# Patient Record
Sex: MALE | Race: WHITE
[De-identification: names, ages, dates, MRNs, and addresses within clinical notes are randomized per-mention and may not be internally consistent; named-entity substitution may affect disease eponyms.]

---

## 2019-04-22 ENCOUNTER — HOSPITAL ENCOUNTER (INPATIENT)
Dept: HOSPITAL 94 - ER | Age: 79
LOS: 2 days | Discharge: HOME HEALTH | DRG: 291 | End: 2019-04-24
Attending: FAMILY MEDICINE | Admitting: FAMILY MEDICINE
Payer: MEDICARE

## 2019-04-22 VITALS — HEIGHT: 75 IN | WEIGHT: 175.38 LBS | BODY MASS INDEX: 21.81 KG/M2

## 2019-04-22 VITALS — DIASTOLIC BLOOD PRESSURE: 81 MMHG | SYSTOLIC BLOOD PRESSURE: 141 MMHG

## 2019-04-22 VITALS — SYSTOLIC BLOOD PRESSURE: 139 MMHG | DIASTOLIC BLOOD PRESSURE: 68 MMHG

## 2019-04-22 VITALS — SYSTOLIC BLOOD PRESSURE: 140 MMHG | DIASTOLIC BLOOD PRESSURE: 74 MMHG

## 2019-04-22 VITALS — DIASTOLIC BLOOD PRESSURE: 66 MMHG | SYSTOLIC BLOOD PRESSURE: 124 MMHG

## 2019-04-22 DIAGNOSIS — I27.20: ICD-10-CM

## 2019-04-22 DIAGNOSIS — Z95.5: ICD-10-CM

## 2019-04-22 DIAGNOSIS — D61.818: ICD-10-CM

## 2019-04-22 DIAGNOSIS — I13.0: Primary | ICD-10-CM

## 2019-04-22 DIAGNOSIS — E11.22: ICD-10-CM

## 2019-04-22 DIAGNOSIS — H35.30: ICD-10-CM

## 2019-04-22 DIAGNOSIS — Z79.899: ICD-10-CM

## 2019-04-22 DIAGNOSIS — N40.0: ICD-10-CM

## 2019-04-22 DIAGNOSIS — E78.5: ICD-10-CM

## 2019-04-22 DIAGNOSIS — N18.9: ICD-10-CM

## 2019-04-22 DIAGNOSIS — J91.8: ICD-10-CM

## 2019-04-22 DIAGNOSIS — Z79.82: ICD-10-CM

## 2019-04-22 DIAGNOSIS — H54.8: ICD-10-CM

## 2019-04-22 DIAGNOSIS — E87.6: ICD-10-CM

## 2019-04-22 DIAGNOSIS — I50.31: ICD-10-CM

## 2019-04-22 DIAGNOSIS — R74.8: ICD-10-CM

## 2019-04-22 DIAGNOSIS — Z93.3: ICD-10-CM

## 2019-04-22 DIAGNOSIS — I25.10: ICD-10-CM

## 2019-04-22 DIAGNOSIS — Z85.048: ICD-10-CM

## 2019-04-22 DIAGNOSIS — F41.1: ICD-10-CM

## 2019-04-22 LAB
ALBUMIN SERPL BCP-MCNC: 3.3 G/DL (ref 3.4–5)
ALBUMIN/GLOB SERPL: 0.8 {RATIO} (ref 1.1–1.5)
ALP SERPL-CCNC: 69 IU/L (ref 46–116)
ALT SERPL W P-5'-P-CCNC: 16 U/L (ref 12–78)
ANION GAP SERPL CALCULATED.3IONS-SCNC: 11 MMOL/L (ref 8–16)
ANISOCYTOSIS BLD QL SMEAR: (no result)
APPEARANCE SPUN FLD: CLEAR
APTT PPP: 28 SECONDS (ref 22–32)
AST SERPL W P-5'-P-CCNC: 12 U/L (ref 10–37)
BASOPHILS # BLD AUTO: 0 X10'3 (ref 0–0.2)
BASOPHILS NFR BLD AUTO: 0.3 % (ref 0–1)
BILIRUB SERPL-MCNC: 1.4 MG/DL (ref 0.1–1)
BODY FLD TYPE: (no result)
BUN SERPL-MCNC: 19 MG/DL (ref 7–18)
BUN/CREAT SERPL: 13.6 (ref 5.4–32)
CALCIUM SERPL-MCNC: 8.9 MG/DL (ref 8.5–10.1)
CHLORIDE SERPL-SCNC: 106 MMOL/L (ref 99–107)
CO2 SERPL-SCNC: 24.6 MMOL/L (ref 24–32)
COLOR FLD: YELLOW
CREAT SERPL-MCNC: 1.4 MG/DL (ref 0.6–1.1)
D DIMER PPP FEU-MCNC: 2.89 MG/L FEU (ref 0–0.5)
EOSINOPHIL # BLD AUTO: 0 X10'3 (ref 0–0.9)
EOSINOPHIL NFR BLD AUTO: 0.2 % (ref 0–6)
ERYTHROCYTE [DISTWIDTH] IN BLOOD BY AUTOMATED COUNT: 19.7 % (ref 11.5–14.5)
GFR SERPL CREATININE-BSD FRML MDRD: 49 ML/MIN
GLUCOSE FLD-MCNC: 116 MG/DL
GLUCOSE SERPL-MCNC: 112 MG/DL (ref 70–104)
HCT VFR BLD AUTO: 25.4 % (ref 42–52)
HGB BLD-MCNC: 8.5 G/DL (ref 14–17.9)
INR PPP: 1.2 INR
LDH FLD-CCNC: 102 U/L
LG PLATELETS BLD QL SMEAR: (no result)
LYMPHOCYTES # BLD AUTO: 0.7 X10'3 (ref 1.1–4.8)
LYMPHOCYTES NFR BLD AUTO: 23.7 % (ref 21–51)
LYMPHOCYTES NFR BLD MANUAL: 20 % (ref 21–51)
LYMPHOCYTES NFR FLD MANUAL: 76 %
MACROCYTES BLD QL SMEAR: (no result)
MCH RBC QN AUTO: 34.9 PG (ref 27–31)
MCHC RBC AUTO-ENTMCNC: 33.3 G/DL (ref 33–36.5)
MCV RBC AUTO: 104.7 FL (ref 78–98)
MESOTHL CELL FLD QL MICRO: (no result)
MONOCYTES # BLD AUTO: 0.2 X10'3 (ref 0–0.9)
MONOCYTES NFR BLD AUTO: 7.5 % (ref 2–12)
MONOCYTES NFR BLD MANUAL: 4 % (ref 2–12)
MONOCYTES NFR FLD MANUAL: 22 %
NEUTROPHILS # BLD AUTO: 2 X10'3 (ref 1.8–7.7)
NEUTROPHILS NFR BLD AUTO: 68.3 % (ref 42–75)
NEUTROPHILS NFR FLD MANUAL: 2 %
NEUTS SEG NFR BLD MANUAL: 76 % (ref 42–75)
PH FLD: 7.4 [PH] (ref 7.63–7.65)
PHOSPHATE SERPL-MCNC: 3.8 MG/DL (ref 2.3–4.5)
PLATELET # BLD AUTO: 50 X10'3 (ref 140–440)
PLATELET BLD QL SMEAR: (no result)
PMV BLD AUTO: 9.7 FL (ref 7.4–10.4)
POTASSIUM SERPL-SCNC: 4 MMOL/L (ref 3.5–5.1)
PROT FLD-MCNC: 3.2 G/DL
PROT SERPL-MCNC: 7.5 G/DL (ref 6.4–8.2)
RBC # BLD AUTO: 2.43 X10'6 (ref 4.7–6.1)
RBC # FLD MANUAL: 430 /CU MM
RBC MORPH BLD: (no result)
SODIUM SERPL-SCNC: 142 MMOL/L (ref 135–145)
SPECIMEN VOL FLD: 56 ML
STOMATOCYTES BLD QL SMEAR: (no result)
TOTAL CELLS COUNTED FLD: 100
WBC # BLD AUTO: 2.9 X10'3 (ref 4.5–11)
WBC # FLD MANUAL: 309 /CU MM (ref 0–1000)

## 2019-04-22 PROCEDURE — B32T1ZZ COMPUTERIZED TOMOGRAPHY (CT SCAN) OF LEFT PULMONARY ARTERY USING LOW OSMOLAR CONTRAST: ICD-10-PCS

## 2019-04-22 PROCEDURE — B32S1ZZ COMPUTERIZED TOMOGRAPHY (CT SCAN) OF RIGHT PULMONARY ARTERY USING LOW OSMOLAR CONTRAST: ICD-10-PCS

## 2019-04-22 PROCEDURE — 88342 IMHCHEM/IMCYTCHM 1ST ANTB: CPT

## 2019-04-22 PROCEDURE — 96374 THER/PROPH/DIAG INJ IV PUSH: CPT

## 2019-04-22 PROCEDURE — 89051 BODY FLUID CELL COUNT: CPT

## 2019-04-22 PROCEDURE — 5A09357 ASSISTANCE WITH RESPIRATORY VENTILATION, LESS THAN 24 CONSECUTIVE HOURS, CONTINUOUS POSITIVE AIRWAY PRESSURE: ICD-10-PCS | Performed by: FAMILY MEDICINE

## 2019-04-22 PROCEDURE — BW211ZZ COMPUTERIZED TOMOGRAPHY (CT SCAN) OF ABDOMEN AND PELVIS USING LOW OSMOLAR CONTRAST: ICD-10-PCS

## 2019-04-22 PROCEDURE — 93005 ELECTROCARDIOGRAM TRACING: CPT

## 2019-04-22 PROCEDURE — 88341 IMHCHEM/IMCYTCHM EA ADD ANTB: CPT

## 2019-04-22 PROCEDURE — 80048 BASIC METABOLIC PNL TOTAL CA: CPT

## 2019-04-22 PROCEDURE — 83615 LACTATE (LD) (LDH) ENZYME: CPT

## 2019-04-22 PROCEDURE — 74177 CT ABD & PELVIS W/CONTRAST: CPT

## 2019-04-22 PROCEDURE — 88108 CYTOPATH CONCENTRATE TECH: CPT

## 2019-04-22 PROCEDURE — 80053 COMPREHEN METABOLIC PANEL: CPT

## 2019-04-22 PROCEDURE — 94760 N-INVAS EAR/PLS OXIMETRY 1: CPT

## 2019-04-22 PROCEDURE — 0W9B3ZZ DRAINAGE OF LEFT PLEURAL CAVITY, PERCUTANEOUS APPROACH: ICD-10-PCS | Performed by: RADIOLOGY

## 2019-04-22 PROCEDURE — 83036 HEMOGLOBIN GLYCOSYLATED A1C: CPT

## 2019-04-22 PROCEDURE — 85025 COMPLETE CBC W/AUTO DIFF WBC: CPT

## 2019-04-22 PROCEDURE — 85730 THROMBOPLASTIN TIME PARTIAL: CPT

## 2019-04-22 PROCEDURE — 80061 LIPID PANEL: CPT

## 2019-04-22 PROCEDURE — 84157 ASSAY OF PROTEIN OTHER: CPT

## 2019-04-22 PROCEDURE — 83735 ASSAY OF MAGNESIUM: CPT

## 2019-04-22 PROCEDURE — 84484 ASSAY OF TROPONIN QUANT: CPT

## 2019-04-22 PROCEDURE — 85379 FIBRIN DEGRADATION QUANT: CPT

## 2019-04-22 PROCEDURE — 0W993ZX DRAINAGE OF RIGHT PLEURAL CAVITY, PERCUTANEOUS APPROACH, DIAGNOSTIC: ICD-10-PCS | Performed by: RADIOLOGY

## 2019-04-22 PROCEDURE — 97162 PT EVAL MOD COMPLEX 30 MIN: CPT

## 2019-04-22 PROCEDURE — 84100 ASSAY OF PHOSPHORUS: CPT

## 2019-04-22 PROCEDURE — 83880 ASSAY OF NATRIURETIC PEPTIDE: CPT

## 2019-04-22 PROCEDURE — 83986 ASSAY PH BODY FLUID NOS: CPT

## 2019-04-22 PROCEDURE — 99285 EMERGENCY DEPT VISIT HI MDM: CPT

## 2019-04-22 PROCEDURE — 97116 GAIT TRAINING THERAPY: CPT

## 2019-04-22 PROCEDURE — 83605 ASSAY OF LACTIC ACID: CPT

## 2019-04-22 PROCEDURE — 36415 COLL VENOUS BLD VENIPUNCTURE: CPT

## 2019-04-22 PROCEDURE — 88305 TISSUE EXAM BY PATHOLOGIST: CPT

## 2019-04-22 PROCEDURE — 71045 X-RAY EXAM CHEST 1 VIEW: CPT

## 2019-04-22 PROCEDURE — 71275 CT ANGIOGRAPHY CHEST: CPT

## 2019-04-22 PROCEDURE — 94660 CPAP INITIATION&MGMT: CPT

## 2019-04-22 PROCEDURE — 84145 PROCALCITONIN (PCT): CPT

## 2019-04-22 PROCEDURE — 87070 CULTURE OTHR SPECIMN AEROBIC: CPT

## 2019-04-22 PROCEDURE — 32555 ASPIRATE PLEURA W/ IMAGING: CPT

## 2019-04-22 PROCEDURE — 82945 GLUCOSE OTHER FLUID: CPT

## 2019-04-22 PROCEDURE — 87040 BLOOD CULTURE FOR BACTERIA: CPT

## 2019-04-22 PROCEDURE — 97530 THERAPEUTIC ACTIVITIES: CPT

## 2019-04-22 PROCEDURE — 85610 PROTHROMBIN TIME: CPT

## 2019-04-22 PROCEDURE — 93306 TTE W/DOPPLER COMPLETE: CPT

## 2019-04-22 NOTE — NUR
Patient in room PCU 3024. I have received report from Nazia MEDINA    and had the opportunity 
to ask questions and assume patient care.

## 2019-04-22 NOTE — NUR
Paged Dr Lopez regarding patient request for  cough drops



PAGER ID:  [phone] 

MESSAGE:  Nazia [phone]. RE RENEE Jauregui. patient is requesting an order for cough drops. Any new 
orders? Thank you!

## 2019-04-22 NOTE — NUR
Problems reprioritized. Patient report given, questions answered & plan of care reviewed 
with Becky MEDINA.

## 2019-04-22 NOTE — NUR
Patient admitted via gurney, accompanied by ED staff. Transferred to bed in room via 
ambulation. Tele monitor applied, 2 RN skin assessment done. VS: T-98.0, , RR 28, 02 
89 2L, /59. Patient has no complaints, and wife is at bedside.

## 2019-04-23 VITALS — SYSTOLIC BLOOD PRESSURE: 125 MMHG | DIASTOLIC BLOOD PRESSURE: 62 MMHG

## 2019-04-23 VITALS — SYSTOLIC BLOOD PRESSURE: 112 MMHG | DIASTOLIC BLOOD PRESSURE: 48 MMHG

## 2019-04-23 VITALS — DIASTOLIC BLOOD PRESSURE: 60 MMHG | SYSTOLIC BLOOD PRESSURE: 122 MMHG

## 2019-04-23 VITALS — DIASTOLIC BLOOD PRESSURE: 54 MMHG | SYSTOLIC BLOOD PRESSURE: 114 MMHG

## 2019-04-23 VITALS — SYSTOLIC BLOOD PRESSURE: 114 MMHG | DIASTOLIC BLOOD PRESSURE: 59 MMHG

## 2019-04-23 VITALS — DIASTOLIC BLOOD PRESSURE: 62 MMHG | SYSTOLIC BLOOD PRESSURE: 133 MMHG

## 2019-04-23 LAB
ALBUMIN SERPL BCP-MCNC: 3.2 G/DL (ref 3.4–5)
ANION GAP SERPL CALCULATED.3IONS-SCNC: 10 MMOL/L (ref 8–16)
ANISOCYTOSIS BLD QL SMEAR: (no result)
BASOPHILS # BLD AUTO: 0 X10'3 (ref 0–0.2)
BASOPHILS NFR BLD AUTO: 0.3 % (ref 0–1)
BUN SERPL-MCNC: 20 MG/DL (ref 7–18)
BUN/CREAT SERPL: 13.7 (ref 5.4–32)
CALCIUM SERPL-MCNC: 8.7 MG/DL (ref 8.5–10.1)
CHLORIDE SERPL-SCNC: 105 MMOL/L (ref 99–107)
CHOLEST SERPL-MCNC: 68 MG/DL (ref 0–200)
CHOLEST/HDLC SERPL: 1.8 {RATIO} (ref 0–4.99)
CO2 SERPL-SCNC: 25.5 MMOL/L (ref 24–32)
CREAT SERPL-MCNC: 1.46 MG/DL (ref 0.6–1.1)
EOSINOPHIL # BLD AUTO: 0 X10'3 (ref 0–0.9)
EOSINOPHIL NFR BLD AUTO: 0 % (ref 0–6)
ERYTHROCYTE [DISTWIDTH] IN BLOOD BY AUTOMATED COUNT: 19.2 % (ref 11.5–14.5)
GFR SERPL CREATININE-BSD FRML MDRD: 47 ML/MIN
GLUCOSE SERPL-MCNC: 123 MG/DL (ref 70–104)
HBA1C MFR BLD: 5.1 % (ref 4.5–6.2)
HCT VFR BLD AUTO: 27.6 % (ref 42–52)
HDLC SERPL-MCNC: 37 MG/DL (ref 35–60)
HGB BLD-MCNC: 9.2 G/DL (ref 14–17.9)
LDLC SERPL DIRECT ASSAY-MCNC: 25 MG/DL (ref 50–100)
LG PLATELETS BLD QL SMEAR: (no result)
LYMPHOCYTES # BLD AUTO: 0.6 X10'3 (ref 1.1–4.8)
LYMPHOCYTES NFR BLD AUTO: 18.4 % (ref 21–51)
MACROCYTES BLD QL SMEAR: (no result)
MAGNESIUM SERPL-MCNC: 1.5 MG/DL (ref 1.5–2.4)
MCH RBC QN AUTO: 34.7 PG (ref 27–31)
MCHC RBC AUTO-ENTMCNC: 33.2 G/DL (ref 33–36.5)
MCV RBC AUTO: 104.3 FL (ref 78–98)
MONOCYTES # BLD AUTO: 0.3 X10'3 (ref 0–0.9)
MONOCYTES NFR BLD AUTO: 9.7 % (ref 2–12)
NEUTROPHILS # BLD AUTO: 2.4 X10'3 (ref 1.8–7.7)
NEUTROPHILS NFR BLD AUTO: 71.6 % (ref 42–75)
PHOSPHATE SERPL-MCNC: 3.7 MG/DL (ref 2.3–4.5)
PLATELET # BLD AUTO: 50 X10'3 (ref 140–440)
PLATELET BLD QL SMEAR: (no result)
PMV BLD AUTO: 10.3 FL (ref 7.4–10.4)
POTASSIUM SERPL-SCNC: 4 MMOL/L (ref 3.5–5.1)
RBC # BLD AUTO: 2.65 X10'6 (ref 4.7–6.1)
RBC MORPH BLD: (no result)
SODIUM SERPL-SCNC: 140 MMOL/L (ref 135–145)
TRIGL SERPL-MCNC: 82 MG/DL (ref 20–135)
WBC # BLD AUTO: 3.4 X10'3 (ref 4.5–11)

## 2019-04-23 RX ADMIN — Medication SCH MG: at 07:33

## 2019-04-23 NOTE — NUR
PAGER ID:  [phone] 

MESSAGE:  Nazia [phone]. RE: Juventino, P. [id number]. Pt states last A1C was 5.4. Patient is 
requesting CC diet instead of current HH order. Would you like a new diet order and would 
you like a new A1C?

## 2019-04-23 NOTE — NUR
PAGER ID:  [phone] 

MESSAGE:  Nazia [phone]. RE: Juventino, P. [id number]. Pt states last A1C was 5.4. Patient is 
requesting CC diet instead of current HH order. Would you like a new diet order and would 
you like a new A1C?

## 2019-04-23 NOTE — NUR
Nurse began to place Jewell Catheter on patient and when it went in he stated "oh no I think 
it is going to hurt and now I don't want it anymore." So nurse stopped placing it. The pt 
then stated he wanted to sit on the toilet and that "it would feel better". Nurse helped pt 
to bathroom and instructed him to pull light cord when he was done and he stated 
understanding. He then stated it felt better and was able to "come out better".

## 2019-04-23 NOTE — NUR
Problems reprioritized. Patient report given, questions answered & plan of care reviewed 
with Yara MEDINA and [place] MEDINA.

## 2019-04-23 NOTE — NUR
Patient in room PCU 3024A. I have received report from Becky MEDINA   and had the opportunity to 
ask questions and assume patient care.

## 2019-04-23 NOTE — NUR
Call placed to DR Raya about pt having great difficulty emptying bladder and states 
"feels like it is constant and starting to hurt". patient has been given IV Lasix which is 
causing him to need to urinate often but he is only going small amounts at a time and 
sitting on the edge of the bed for the last hour just holding the urinal under himself. 
Order received to place a Jewell catheter.

## 2019-04-23 NOTE — NUR
Patient in room PCU 3029G. I have received report from ADAM COATES and had the opportunity to 
ask questions and assume patient care. 20G R AC SL. AWAKE FOR BEDSIDE REPORT AND STABLE AT 
THIS TIME. WILL CONTINUE TO MONITOR CLOSELY.

## 2019-04-24 VITALS — SYSTOLIC BLOOD PRESSURE: 108 MMHG | DIASTOLIC BLOOD PRESSURE: 50 MMHG

## 2019-04-24 VITALS — SYSTOLIC BLOOD PRESSURE: 144 MMHG | DIASTOLIC BLOOD PRESSURE: 63 MMHG

## 2019-04-24 VITALS — DIASTOLIC BLOOD PRESSURE: 77 MMHG | SYSTOLIC BLOOD PRESSURE: 127 MMHG

## 2019-04-24 LAB
ALBUMIN SERPL BCP-MCNC: 2.8 G/DL (ref 3.4–5)
ANION GAP SERPL CALCULATED.3IONS-SCNC: 8 MMOL/L (ref 8–16)
ANISOCYTOSIS BLD QL SMEAR: (no result)
BASOPHILS # BLD AUTO: 0 X10'3 (ref 0–0.2)
BASOPHILS NFR BLD AUTO: 0.6 % (ref 0–1)
BUN SERPL-MCNC: 20 MG/DL (ref 7–18)
BUN/CREAT SERPL: 14.8 (ref 5.4–32)
CALCIUM SERPL-MCNC: 8.5 MG/DL (ref 8.5–10.1)
CHLORIDE SERPL-SCNC: 104 MMOL/L (ref 99–107)
CO2 SERPL-SCNC: 27.1 MMOL/L (ref 24–32)
CREAT SERPL-MCNC: 1.35 MG/DL (ref 0.6–1.1)
EOSINOPHIL # BLD AUTO: 0 X10'3 (ref 0–0.9)
EOSINOPHIL NFR BLD AUTO: 0.3 % (ref 0–6)
EOSINOPHIL NFR BLD MANUAL: 1 % (ref 0–6)
ERYTHROCYTE [DISTWIDTH] IN BLOOD BY AUTOMATED COUNT: 18.8 % (ref 11.5–14.5)
GFR SERPL CREATININE-BSD FRML MDRD: 51 ML/MIN
GLUCOSE SERPL-MCNC: 101 MG/DL (ref 70–104)
HCT VFR BLD AUTO: 22 % (ref 42–52)
HGB BLD-MCNC: 7.4 G/DL (ref 14–17.9)
LYMPHOCYTES # BLD AUTO: 0.9 X10'3 (ref 1.1–4.8)
LYMPHOCYTES NFR BLD AUTO: 48.1 % (ref 21–51)
LYMPHOCYTES NFR BLD MANUAL: 50 % (ref 21–51)
MACROCYTES BLD QL SMEAR: (no result)
MAGNESIUM SERPL-MCNC: 1.4 MG/DL (ref 1.5–2.4)
MCH RBC QN AUTO: 35 PG (ref 27–31)
MCHC RBC AUTO-ENTMCNC: 33.8 G/DL (ref 33–36.5)
MCV RBC AUTO: 103.6 FL (ref 78–98)
MONOCYTES # BLD AUTO: 0.2 X10'3 (ref 0–0.9)
MONOCYTES NFR BLD AUTO: 9.6 % (ref 2–12)
MONOCYTES NFR BLD MANUAL: 6 % (ref 2–12)
NEUTROPHILS # BLD AUTO: 0.8 X10'3 (ref 1.8–7.7)
NEUTROPHILS NFR BLD AUTO: 41.4 % (ref 42–75)
NEUTS SEG NFR BLD MANUAL: 43 % (ref 42–75)
PHOSPHATE SERPL-MCNC: 4.3 MG/DL (ref 2.3–4.5)
PLATELET # BLD AUTO: 46 X10'3 (ref 140–440)
PLATELET BLD QL SMEAR: (no result)
PMV BLD AUTO: 10.1 FL (ref 7.4–10.4)
POTASSIUM SERPL-SCNC: 3.4 MMOL/L (ref 3.5–5.1)
RBC # BLD AUTO: 2.13 X10'6 (ref 4.7–6.1)
RBC MORPH BLD: (no result)
SODIUM SERPL-SCNC: 139 MMOL/L (ref 135–145)
TOTAL CELLS COUNTED FLD: 100
WBC # BLD AUTO: 1.9 X10'3 (ref 4.5–11)

## 2019-04-24 RX ADMIN — POTASSIUM CHLORIDE PRN MEQ: 1500 TABLET, EXTENDED RELEASE ORAL at 13:44

## 2019-04-24 RX ADMIN — Medication SCH MG: at 08:23

## 2019-04-24 RX ADMIN — POTASSIUM CHLORIDE PRN MEQ: 1500 TABLET, EXTENDED RELEASE ORAL at 08:23

## 2019-04-24 NOTE — NUR
Patient in room PCU 3024. I have received report from ADAM Delarosa and had the opportunity to 
ask questions and assume patient care.

## 2019-04-24 NOTE — NUR
O2 Sat at rest on room air:_94__%

     If below 89%:

Recovery O2 Sat at rest on ___LPM:___%:___% via______________(mask/nasal cannula, etc..)

No further documentation is necessary.



     If O2 Sat did not drop below 89% on room air,ambulate patient on room air.

O2 Sat while ambulating on room air:__84_%

Recovery O2 Sat while ambulating on __2_LPM:__91_%

No further documentation is necessary.



If patient does not drop below 89% while ambulating, he/she does not qualify for home O2.

## 2019-04-24 NOTE — NUR
PAGER ID:  [phone] 

MESSAGE:   3024A Abilio Jauregui: H&H 7.4 and 22. Noc RN notified [facility] but he said to defer 
it to daytime hospitalist to see what you wanted. ADAM Andres Ext [phone]

## 2019-04-24 NOTE — NUR
[facility] documentation:

I have reviewed and agree with all interventions, assessments performed and documented by 
[place] MEDINA.



[facility] Medication Administration:

For this medication-pass time frame, all medication were reviewed, dispensed, administered 
and documented per hospital policy by [place] MEDINA.

## 2019-04-24 NOTE — NUR
CRITICAL VALUE TAKEN BY NAE IN LAB. HCT 22.0 AND PLT 46. DR. LACEY NOTIFIED. NO NEW 
ORDERS AT THIS TIME.

## 2019-10-03 ENCOUNTER — HOSPITAL ENCOUNTER (OUTPATIENT)
Dept: HOSPITAL 94 - SSTAY O | Age: 79
Discharge: HOME | End: 2019-10-03
Attending: RADIOLOGY
Payer: MEDICARE

## 2019-10-03 VITALS — HEIGHT: 74 IN | BODY MASS INDEX: 22.97 KG/M2 | WEIGHT: 179.02 LBS

## 2019-10-03 VITALS — DIASTOLIC BLOOD PRESSURE: 59 MMHG | SYSTOLIC BLOOD PRESSURE: 109 MMHG

## 2019-10-03 DIAGNOSIS — Z88.8: ICD-10-CM

## 2019-10-03 DIAGNOSIS — J90: Primary | ICD-10-CM

## 2019-10-03 DIAGNOSIS — D64.9: ICD-10-CM

## 2019-10-03 DIAGNOSIS — I12.9: ICD-10-CM

## 2019-10-03 DIAGNOSIS — Z79.899: ICD-10-CM

## 2019-10-03 DIAGNOSIS — E11.22: ICD-10-CM

## 2019-10-03 DIAGNOSIS — E78.5: ICD-10-CM

## 2019-10-03 DIAGNOSIS — I25.10: ICD-10-CM

## 2019-10-03 DIAGNOSIS — Z85.048: ICD-10-CM

## 2019-10-03 DIAGNOSIS — N18.9: ICD-10-CM

## 2019-10-03 PROCEDURE — 76705 ECHO EXAM OF ABDOMEN: CPT

## 2019-10-03 PROCEDURE — 76604 US EXAM CHEST: CPT

## 2019-10-03 NOTE — NUR
PATIENT GOT ULTRASOUND OF THE LUNGS BY LIZ DEMPSEY. THERE WAS NOT ENOUGH FLUID AFTER 
ULTRASOUND TO DO THE PROCEDURE. PROCEDURE WAS CANCELLED.